# Patient Record
Sex: FEMALE | Race: WHITE | Employment: UNEMPLOYED | URBAN - METROPOLITAN AREA
[De-identification: names, ages, dates, MRNs, and addresses within clinical notes are randomized per-mention and may not be internally consistent; named-entity substitution may affect disease eponyms.]

---

## 2022-06-02 ENCOUNTER — DOCUMENTATION ONLY (OUTPATIENT)
Dept: DIABETES SERVICES | Age: 57
End: 2022-06-02

## 2022-06-09 ENCOUNTER — DOCUMENTATION ONLY (OUTPATIENT)
Dept: DIABETES SERVICES | Age: 57
End: 2022-06-09

## 2022-06-09 NOTE — PROGRESS NOTES
[facility] Diabetes Education Class    [facility]. Dates and class topics attended are noted below. Location: [facility]    Date Attended Class/Topic   6/8/22 Class 1: Understanding Diabetes  Strategies to staying healthy with diabetes were covered including prevention and treatment of acute and chronic complications of diabetes, the different types of diabetes, how diabetes is diagnosed, principles of self-monitoring of blood glucose, target blood glucose and A1C levels, simple healthy eating strategies and sick day management. Please contact me if you have questions.     Svitlana Crenshaw RN, BSN, [facility]  Professional   Glycemic Control Team   Phone:  [phone]  Tues - Thurs 8:30 - 4:30